# Patient Record
Sex: FEMALE | Race: OTHER | NOT HISPANIC OR LATINO | URBAN - METROPOLITAN AREA
[De-identification: names, ages, dates, MRNs, and addresses within clinical notes are randomized per-mention and may not be internally consistent; named-entity substitution may affect disease eponyms.]

---

## 2024-09-06 ENCOUNTER — EMERGENCY (EMERGENCY)
Facility: HOSPITAL | Age: 39
LOS: 1 days | Discharge: HOME | End: 2024-09-06
Attending: EMERGENCY MEDICINE | Admitting: EMERGENCY MEDICINE
Payer: COMMERCIAL

## 2024-09-06 VITALS
SYSTOLIC BLOOD PRESSURE: 127 MMHG | HEIGHT: 62 IN | TEMPERATURE: 98 F | RESPIRATION RATE: 16 BRPM | OXYGEN SATURATION: 99 % | WEIGHT: 160.06 LBS | HEART RATE: 66 BPM | DIASTOLIC BLOOD PRESSURE: 81 MMHG

## 2024-09-06 VITALS
OXYGEN SATURATION: 98 % | RESPIRATION RATE: 16 BRPM | DIASTOLIC BLOOD PRESSURE: 67 MMHG | SYSTOLIC BLOOD PRESSURE: 122 MMHG | HEART RATE: 85 BPM | TEMPERATURE: 98 F

## 2024-09-06 DIAGNOSIS — R51.9 HEADACHE, UNSPECIFIED: ICD-10-CM

## 2024-09-06 DIAGNOSIS — R11.2 NAUSEA WITH VOMITING, UNSPECIFIED: ICD-10-CM

## 2024-09-06 DIAGNOSIS — R00.1 BRADYCARDIA, UNSPECIFIED: ICD-10-CM

## 2024-09-06 DIAGNOSIS — R42 DIZZINESS AND GIDDINESS: ICD-10-CM

## 2024-09-06 DIAGNOSIS — G43.909 MIGRAINE, UNSPECIFIED, NOT INTRACTABLE, WITHOUT STATUS MIGRAINOSUS: ICD-10-CM

## 2024-09-06 LAB
ANION GAP SERPL CALC-SCNC: 6 MMOL/L — SIGNIFICANT CHANGE UP (ref 5–17)
BASOPHILS # BLD AUTO: 0.04 K/UL — SIGNIFICANT CHANGE UP (ref 0–0.2)
BASOPHILS NFR BLD AUTO: 0.7 % — SIGNIFICANT CHANGE UP (ref 0–2)
BUN SERPL-MCNC: 9 MG/DL — SIGNIFICANT CHANGE UP (ref 7–23)
CALCIUM SERPL-MCNC: 9.4 MG/DL — SIGNIFICANT CHANGE UP (ref 8.4–10.5)
CHLORIDE SERPL-SCNC: 102 MMOL/L — SIGNIFICANT CHANGE UP (ref 96–108)
CO2 SERPL-SCNC: 28 MMOL/L — SIGNIFICANT CHANGE UP (ref 22–31)
CREAT SERPL-MCNC: 0.66 MG/DL — SIGNIFICANT CHANGE UP (ref 0.5–1.3)
EGFR: 114 ML/MIN/1.73M2 — SIGNIFICANT CHANGE UP
EOSINOPHIL # BLD AUTO: 0.06 K/UL — SIGNIFICANT CHANGE UP (ref 0–0.5)
EOSINOPHIL NFR BLD AUTO: 1 % — SIGNIFICANT CHANGE UP (ref 0–6)
GLUCOSE SERPL-MCNC: 90 MG/DL — SIGNIFICANT CHANGE UP (ref 70–99)
HCT VFR BLD CALC: 34.1 % — LOW (ref 34.5–45)
HGB BLD-MCNC: 10.4 G/DL — LOW (ref 11.5–15.5)
IMM GRANULOCYTES NFR BLD AUTO: 0.2 % — SIGNIFICANT CHANGE UP (ref 0–0.9)
LYMPHOCYTES # BLD AUTO: 1.57 K/UL — SIGNIFICANT CHANGE UP (ref 1–3.3)
LYMPHOCYTES # BLD AUTO: 27.4 % — SIGNIFICANT CHANGE UP (ref 13–44)
MCHC RBC-ENTMCNC: 24.4 PG — LOW (ref 27–34)
MCHC RBC-ENTMCNC: 30.5 GM/DL — LOW (ref 32–36)
MCV RBC AUTO: 79.9 FL — LOW (ref 80–100)
MONOCYTES # BLD AUTO: 0.25 K/UL — SIGNIFICANT CHANGE UP (ref 0–0.9)
MONOCYTES NFR BLD AUTO: 4.4 % — SIGNIFICANT CHANGE UP (ref 2–14)
NEUTROPHILS # BLD AUTO: 3.81 K/UL — SIGNIFICANT CHANGE UP (ref 1.8–7.4)
NEUTROPHILS NFR BLD AUTO: 66.3 % — SIGNIFICANT CHANGE UP (ref 43–77)
NRBC # BLD: 0 /100 WBCS — SIGNIFICANT CHANGE UP (ref 0–0)
PLATELET # BLD AUTO: 360 K/UL — SIGNIFICANT CHANGE UP (ref 150–400)
POTASSIUM SERPL-MCNC: 4.5 MMOL/L — SIGNIFICANT CHANGE UP (ref 3.5–5.3)
POTASSIUM SERPL-SCNC: 4.5 MMOL/L — SIGNIFICANT CHANGE UP (ref 3.5–5.3)
RBC # BLD: 4.27 M/UL — SIGNIFICANT CHANGE UP (ref 3.8–5.2)
RBC # FLD: 15.7 % — HIGH (ref 10.3–14.5)
SODIUM SERPL-SCNC: 136 MMOL/L — SIGNIFICANT CHANGE UP (ref 135–145)
WBC # BLD: 5.74 K/UL — SIGNIFICANT CHANGE UP (ref 3.8–10.5)
WBC # FLD AUTO: 5.74 K/UL — SIGNIFICANT CHANGE UP (ref 3.8–10.5)

## 2024-09-06 PROCEDURE — 93005 ELECTROCARDIOGRAM TRACING: CPT

## 2024-09-06 PROCEDURE — 80048 BASIC METABOLIC PNL TOTAL CA: CPT

## 2024-09-06 PROCEDURE — 36415 COLL VENOUS BLD VENIPUNCTURE: CPT

## 2024-09-06 PROCEDURE — 93010 ELECTROCARDIOGRAM REPORT: CPT

## 2024-09-06 PROCEDURE — 99284 EMERGENCY DEPT VISIT MOD MDM: CPT

## 2024-09-06 PROCEDURE — 96374 THER/PROPH/DIAG INJ IV PUSH: CPT

## 2024-09-06 PROCEDURE — 96361 HYDRATE IV INFUSION ADD-ON: CPT

## 2024-09-06 PROCEDURE — 85025 COMPLETE CBC W/AUTO DIFF WBC: CPT

## 2024-09-06 PROCEDURE — 96375 TX/PRO/DX INJ NEW DRUG ADDON: CPT

## 2024-09-06 PROCEDURE — 99284 EMERGENCY DEPT VISIT MOD MDM: CPT | Mod: 25

## 2024-09-06 RX ORDER — IBUPROFEN 600 MG
1 TABLET ORAL
Qty: 20 | Refills: 0
Start: 2024-09-06

## 2024-09-06 RX ORDER — METOCLOPRAMIDE HCL 5 MG
10 TABLET ORAL ONCE
Refills: 0 | Status: COMPLETED | OUTPATIENT
Start: 2024-09-06 | End: 2024-09-06

## 2024-09-06 RX ORDER — KETOROLAC TROMETHAMINE 30 MG/ML
15 INJECTION, SOLUTION INTRAMUSCULAR ONCE
Refills: 0 | Status: DISCONTINUED | OUTPATIENT
Start: 2024-09-06 | End: 2024-09-06

## 2024-09-06 RX ORDER — SODIUM CHLORIDE 9 MG/ML
1000 INJECTION INTRAMUSCULAR; INTRAVENOUS; SUBCUTANEOUS ONCE
Refills: 0 | Status: COMPLETED | OUTPATIENT
Start: 2024-09-06 | End: 2024-09-06

## 2024-09-06 RX ORDER — METOCLOPRAMIDE HCL 5 MG
1 TABLET ORAL
Qty: 20 | Refills: 0
Start: 2024-09-06

## 2024-09-06 RX ORDER — DIPHENHYDRAMINE HCL 50 MG
25 CAPSULE ORAL ONCE
Refills: 0 | Status: COMPLETED | OUTPATIENT
Start: 2024-09-06 | End: 2024-09-06

## 2024-09-06 RX ADMIN — SODIUM CHLORIDE 1000 MILLILITER(S): 9 INJECTION INTRAMUSCULAR; INTRAVENOUS; SUBCUTANEOUS at 14:05

## 2024-09-06 RX ADMIN — KETOROLAC TROMETHAMINE 15 MILLIGRAM(S): 30 INJECTION, SOLUTION INTRAMUSCULAR at 12:31

## 2024-09-06 RX ADMIN — Medication 10 MILLIGRAM(S): at 12:31

## 2024-09-06 RX ADMIN — Medication 25 MILLIGRAM(S): at 12:31

## 2024-09-06 RX ADMIN — KETOROLAC TROMETHAMINE 15 MILLIGRAM(S): 30 INJECTION, SOLUTION INTRAMUSCULAR at 14:05

## 2024-09-06 RX ADMIN — SODIUM CHLORIDE 1000 MILLILITER(S): 9 INJECTION INTRAMUSCULAR; INTRAVENOUS; SUBCUTANEOUS at 12:31

## 2024-09-06 NOTE — ED PROVIDER NOTE - PATIENT PORTAL LINK FT
You can access the FollowMyHealth Patient Portal offered by [facility] by registering at the following website: [email or web address]. By joining [employer]’s FollowMyHealth portal, you will also be able to view your health information using other applications (apps) compatible with our system.

## 2024-09-06 NOTE — ED PROVIDER NOTE - CLINICAL SUMMARY MEDICAL DECISION MAKING FREE TEXT BOX
39-year-old female with past medical history of migraines, denies any other medical hx or chronic medications,  presents today with left-sided headache, dizziness and nausea/ vomiting.  Patient states she woke up this morning with a left-sided headache and nausea c/w prior migarines.  Went to work with worsening headache, dizziness and nausea with 1 episode of nbnb emesis.  Patient states that she felt as if she was going to pass out.  Work called EMS and patient was brought in by ambulance to ED for further evaluation.  No fevers, focal weakness, focal numbness, vision changes, slurred speech, CP, SOB or any other complaints.    ED course: VS noted. Patient afebrile and rest of VS WNL. Exam is non-focal. ECG with no acute findings. Urine hcg negative. Labs show mild anemia (patient aware of this), otherwise normal. Patient given IVF, Reglan, Toradol and Benadryl with resolution of sxs. Resting comfortably. Non-toxic appearing and stable for discharge. To follow up outpatient. Strict return precautions given.

## 2024-09-06 NOTE — ED ADULT NURSE NOTE - OBJECTIVE STATEMENT
pt received into spot 3 A&Ox4 ambulatory appears comfortable BIBA from work for eval head ache dizziness room spinning sensation with nausea no vomiting. States head ache was there upon waking at 6AM this morning. Dizziness resolved but started at work hours later. Pt denies numbness tingling blurry vision changes in vision no facial droop slurred speech noted. b/l equal hand grasps on exam no drift to upper/lower extremities. Respirations unlabored abd nondistended no fever chills. IV placed labs sent 12lead ekg done nsr noted medicated per orders

## 2024-09-06 NOTE — ED PROVIDER NOTE - OBJECTIVE STATEMENT
39-year-old female with past medical history of migraines, denies any other medical hx or chronic medications,  presents today with left-sided headache, dizziness and nausea/ vomiting.  Patient states she woke up this morning with a left-sided headache and nausea c/w prior migarines.  Went to work with worsening headache, dizziness and nausea with 1 episode of nbnb emesis.  Patient states that she felt as if she was going to pass out.  Work called EMS and patient was brought in by ambulance to ED for further evaluation.  No fevers, focal weakness, focal numbness, vision changes, slurred speech, CP, SOB or any other complaints.

## 2024-09-06 NOTE — ED PROVIDER NOTE - NSFOLLOWUPINSTRUCTIONS_ED_ALL_ED_FT
Please follow-up with your primary care doctor in 3 to 5 days.  Return to the emergency department if you develop any concerning symptoms.    Migraine Headache    WHAT YOU NEED TO KNOW:    What is a migraine headache? A migraine is a severe headache. The pain can be so severe that it interferes with your daily activities. A migraine can last a few hours up to several days. The exact cause of migraines is not known. A family history of migraines increases your risk. Your risk is also higher if you are a woman or take medicines such as estrogen or a vasodilator.    What are the warning signs that a migraine headache is about to start? Warning signs usually start 15 to 60 minutes before the headache:    Visual changes (auras), such as blurred vision, temporary blind or bright spots, lines, or hallucinations    Unusual tiredness or frequent yawning    Tingling in an arm or leg  What are the signs and symptoms of a migraine headache? A migraine headache usually begins as a dull ache around the eye or temple. The pain may get worse with movement. You may also have the following:    Pain in your head that may increase to the point that you cannot do everyday activities    Pain on one or both sides of your head    Throbbing, pulsing, or pounding pain in your head    Nausea and vomiting    Sensitivity to light, noise, or smells  Headache Types  What can trigger a migraine headache?    Stress, eye strain, oversleeping, or not getting enough sleep    Hormone changes in women from birth control pills, pregnancy, menopause, or during a monthly period    Skipping meals, going too long without eating, or not drinking enough liquids    Certain foods or drinks such as chocolate, hard cheese, alcohol, or drinks that contain caffeine    Foods that contain gluten, nitrates, MSG, or artificial sweeteners    Sunlight, bright or flashing lights, loud noises, smoke, or strong smells    Heat, humidity, or changes in the weather  How is a migraine headache diagnosed? Your healthcare provider will ask about your headaches. Describe the pain and any other symptoms, such as nausea. Tell the provider if you think anything triggered the pain. The provider will also want to know what you ate and drank before the pain started. Tell the provider about any medical conditions you have or that run in your family. Include any recent stressors you have had. You may also need any of the following:    A neurologic exam is used to check how your pupils react to light. Your healthcare provider may check your memory, hand grasp, and balance.    CT or MRI pictures may be taken of your brain. You may be given contrast liquid to help your brain show up better in the pictures. Tell the healthcare provider if you have ever had an allergic reaction to contrast liquid. Do not enter the MRI room with anything metal. Metal can cause serious injury. Tell the healthcare provider if you have any metal in or on your body.  How is a migraine headache treated? Migraines cannot be cured. The goal of treatment is to reduce your symptoms.    Medicines may be given to help you manage migraines. Take medicine as soon as you feel a migraine begin, or as directed. Your healthcare provider may recommend any of the following:  Migraine medicines are used to help prevent or stop a migraine.    NSAIDs help decrease swelling and pain or fever. This medicine is available with or without a doctor's order. NSAIDs can cause stomach bleeding or kidney problems in certain people. If you take blood thinner medicine, always ask your healthcare provider if NSAIDs are safe for you. Always read the medicine label and follow directions.    Acetaminophen decreases pain and fever. It is available without a doctor's order. Ask how much to take and how often to take it. Follow directions. Read the labels of all other medicines you are using to see if they also contain acetaminophen, or ask your doctor or pharmacist. Acetaminophen can cause liver damage if not taken correctly.    Prescription pain medicine may be given. Ask your healthcare provider how to take this medicine safely. Some prescription pain medicines contain acetaminophen. Do not take other medicines that contain acetaminophen without talking to your healthcare provider. Too much acetaminophen may cause liver damage. Prescription pain medicine may cause constipation. Ask your healthcare provider how to prevent or treat constipation.    Antinausea medicine may be given to calm your stomach and to help prevent vomiting. This medicine can also help relieve pain.    Cognitive behavior therapy (CBT) can help you learn ways to manage and prevent migraines. A therapist can teach you to relax and to reduce stress. You may learn ways to create healthy nutrition, activity, and sleep habits to prevent migraines. The therapist can also help you manage conditions that can affect migraines, such as anxiety or depression.  What can I do to manage my symptoms?    Rest in a dark, quiet room. This will help decrease your pain. Sleep may also help relieve the pain.    Apply ice to decrease pain. Use an ice pack, or put crushed ice in a plastic bag. Cover the ice pack with a towel and place it on your head. Apply ice for 15 to 20 minutes every hour.    Apply heat to decrease pain and muscle spasms. Use a small towel dampened with warm water or a heating pad, or sit in a warm bath. Apply heat on the area for 20 to 30 minutes every 2 hours. You may alternate heat and ice.    Keep a migraine record. Write down when your migraines start and stop. Include your symptoms and what you were doing when a migraine began. Record what you ate or drank for 24 hours before the migraine started. Keep track of what you did to treat your migraine and if it worked. Bring the migraine record with you to visits with your healthcare provider.  What can I do to prevent another migraine headache?    Prevent a medicine overuse headache. Take pain medicines only as long as directed. A medicine may be limited to a certain amount each month. Your healthcare provider can help you create a plan so you get a safe amount each month.    Do not smoke. Nicotine and other chemicals in cigarettes and cigars can trigger a migraine or make it worse. Ask your healthcare provider for information if you currently smoke and need help to quit. E-cigarettes or smokeless tobacco still contain nicotine. Talk to your healthcare provider before you use these products.    Do not drink alcohol. Alcohol can trigger a migraine. It can also keep medicines used to treat your migraines from working.    Be physically active. Physical activity, such as exercise, may help prevent migraines. Talk to your healthcare provider about the best activity plan for you. Try to get at least 30 minutes of physical activity on most days.   FAMILY WALKING FOR EXERCISE      Manage stress. Stress may trigger a migraine. Learn new ways to relax, such as deep breathing.    Create a sleep schedule. Go to bed and get up at the same times each day. Do not watch television before bed.    Eat a variety of healthy foods. Include healthy foods such as include fruit, vegetables, whole-grain breads, low-fat dairy products, beans, lean meat, and fish. Do not have food or drinks that trigger your migraines.  Healthy Foods      Drink more liquids to prevent dehydration. Your healthcare provider can tell you how much liquid to drink each day and which liquids are best for you.  Call your local emergency number (911 in the [place]) or have someone call if:    You feel like you are going to faint, you become confused, or you have a seizure.    When should I seek immediate care?    You have a headache that seems different or much worse than your usual migraine headache.    You have a severe headache with a fever or a stiff neck.    You have new problems with speech, vision, balance, or movement.  When should I call my doctor?    Your migraines interfere with your daily activities.    Your medicines or treatments stop working.    You have questions or concerns about your condition or care.  CARE AGREEMENT:    You have the right to help plan your care. Learn about your health condition and how it may be treated. Discuss treatment options with your healthcare providers to decide what care you want to receive. You always have the right to refuse treatment.    © [employer] 1973, 2024

## 2024-09-06 NOTE — ED ADULT TRIAGE NOTE - CHIEF COMPLAINT QUOTE
Patient brought in by EMS for dizziness since last night. Endorses L sided HA. No weakness/dizziness. No slurred speech. No blurry vision.

## 2024-09-06 NOTE — ED ADULT NURSE NOTE - BEFAST ARM SIDE DRIFT
[FreeTextEntry8] : patient here for fatigue, Insomnia. falls asleep but does not stay asleep\par Also c/o ED\par 
No

## 2025-01-27 NOTE — ED PROVIDER NOTE - CROS ED CONS ALL NEG
Addended by: KEHRER, MEREDITH LEA on: 1/27/2025 12:53 PM     Modules accepted: Orders    
negative...

## 2025-03-31 ENCOUNTER — APPOINTMENT (OUTPATIENT)
Age: 40
End: 2025-03-31
Payer: COMMERCIAL

## 2025-03-31 PROCEDURE — D9310: CPT

## 2025-03-31 PROCEDURE — D0330 PANORAMIC RADIOGRAPHIC IMAGE: CPT

## 2025-06-06 ENCOUNTER — APPOINTMENT (OUTPATIENT)
Age: 40
End: 2025-06-06
Payer: MEDICAID

## 2025-06-06 PROCEDURE — D7140: CPT

## 2025-06-06 PROCEDURE — D7210: CPT

## 2025-06-06 PROCEDURE — D9223: CPT

## 2025-06-06 PROCEDURE — D9222: CPT

## 2025-06-16 ENCOUNTER — APPOINTMENT (OUTPATIENT)
Age: 40
End: 2025-06-16

## 2025-06-20 ENCOUNTER — APPOINTMENT (OUTPATIENT)
Age: 40
End: 2025-06-20